# Patient Record
(demographics unavailable — no encounter records)

---

## 2025-06-13 NOTE — PLAN
[FreeTextEntry1] : Health Maintenance: BSA, calcium, vitamin D and exercise d/w pt Pap/HPV conducted  Rx given for mammogram and breast sonogram Advised pt to schedule colonoscopy - GI MD recommendations given Advised pt to see PCP and dermatologist annually  Dyspareunia:  Discussed likely 2/2 decreased estrogen/GSM C/w lubricants during intercourse (Uberlube, Hyalogyn, Ky jelly, Astroglide, coconut oil) Apply OTC Revaree q3 day D/w pt pelvic floor PT, vaginal dilators- pt to consider   Estrace cream Rx after discussion w/ Dr. 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

## 2025-06-13 NOTE — SIGNATURES
[TextEntry] : I, Nina Larkin, am scribing for and in the presence of VIN Brewer M.D. in the following sections: HISTORY OF PRESENT ILLNESS, PAST MEDICAL/FAMILY/SOCIAL HISTORY, REVIEW OF SYSTEMS, PHYSICAL EXAM, ASSESSMENT/PLAN.  All medical record entries made by the Scribe were at my,  VIN Brewer M.D., direction and personally dictated by me on 04/10/2025. I have personally reviewed the chart and agree that the record reflects my personal performance of the history, physical exam, assessment, and plan.

## 2025-06-13 NOTE — COUNSELING
[Nutrition/ Exercise/ Weight Management] : nutrition, exercise, weight management [Vitamins/Supplements] : vitamins/supplements [Breast Self Exam] : breast self exam [Confidentiality] : confidentiality [Lab Results] : lab results

## 2025-06-13 NOTE — PHYSICAL EXAM
[Chaperoned Physical Exam] : A chaperone was present in the examining room during all aspects of the physical examination. [Appropriately responsive] : appropriately responsive [Alert] : alert [No Acute Distress] : no acute distress [No Lymphadenopathy] : no lymphadenopathy [Regular Rate Rhythm] : regular rate rhythm [No Murmurs] : no murmurs [Clear to Auscultation B/L] : clear to auscultation bilaterally [Soft] : soft [Non-tender] : non-tender [Non-distended] : non-distended [No HSM] : No HSM [No Lesions] : no lesions [No Mass] : no mass [Oriented x3] : oriented x3 [Examination Of The Breasts] : a normal appearance [No Masses] : no breast masses were palpable [Labia Majora] : normal [Labia Minora] : normal [Normal] : normal [Uterine Adnexae] : normal [FreeTextEntry2] :  Nina murillo)  [FreeTextEntry6] :  d in the 3:00 position in R breast 3 x 2 cm at areolar margin  consistent w/ mammogram findings [Vulvar Atrophy] : vulvar atrophy [Atrophy] : atrophy [FreeTextEntry9] : guaiac neg, no masses noted

## 2025-06-13 NOTE — HISTORY OF PRESENT ILLNESS
[Yes] : Patient has concerns regarding sex [Currently Active] : currently active [TextBox_4] : Pelvic sono 06/13/2025: multiple stable fibroids, Endo lining .96 mm, normal ovaries  [Mammogramdate] : 06/2025  [TextBox_19] : BIRADS 2, TC score 37.7% [BreastSonogramDate] : 06/2025  [TextBox_25] : BIRADS 2 [PapSmeardate] : 06/2024 [TextBox_31] : HRHPV neg [BoneDensityDate] : 06/2024 [TextBox_37] : ostepenia, FRAX wnl [TextBox_43] : never done,  [FreeTextEntry1] : dyspareunia